# Patient Record
Sex: FEMALE | Race: WHITE | NOT HISPANIC OR LATINO | URBAN - METROPOLITAN AREA
[De-identification: names, ages, dates, MRNs, and addresses within clinical notes are randomized per-mention and may not be internally consistent; named-entity substitution may affect disease eponyms.]

---

## 2018-06-26 ENCOUNTER — APPOINTMENT (OUTPATIENT)
Age: 38
Setting detail: DERMATOLOGY
End: 2018-06-29

## 2018-06-26 DIAGNOSIS — D22 MELANOCYTIC NEVI: ICD-10-CM

## 2018-06-26 DIAGNOSIS — L57.8 OTHER SKIN CHANGES DUE TO CHRONIC EXPOSURE TO NONIONIZING RADIATION: ICD-10-CM

## 2018-06-26 DIAGNOSIS — D18.0 HEMANGIOMA: ICD-10-CM

## 2018-06-26 DIAGNOSIS — D485 NEOPLASM OF UNCERTAIN BEHAVIOR OF SKIN: ICD-10-CM

## 2018-06-26 PROBLEM — D22.5 MELANOCYTIC NEVI OF TRUNK: Status: ACTIVE | Noted: 2018-06-26

## 2018-06-26 PROBLEM — D18.01 HEMANGIOMA OF SKIN AND SUBCUTANEOUS TISSUE: Status: ACTIVE | Noted: 2018-06-26

## 2018-06-26 PROBLEM — D22.62 MELANOCYTIC NEVI OF LEFT UPPER LIMB, INCLUDING SHOULDER: Status: ACTIVE | Noted: 2018-06-26

## 2018-06-26 PROBLEM — D22.61 MELANOCYTIC NEVI OF RIGHT UPPER LIMB, INCLUDING SHOULDER: Status: ACTIVE | Noted: 2018-06-26

## 2018-06-26 PROBLEM — D48.5 NEOPLASM OF UNCERTAIN BEHAVIOR OF SKIN: Status: ACTIVE | Noted: 2018-06-26

## 2018-06-26 PROCEDURE — 99203 OFFICE O/P NEW LOW 30 MIN: CPT | Mod: 25

## 2018-06-26 PROCEDURE — 11101: CPT

## 2018-06-26 PROCEDURE — 11100: CPT

## 2018-06-26 PROCEDURE — OTHER MIPS QUALITY: OTHER

## 2018-06-26 PROCEDURE — OTHER BIOPSY BY SHAVE METHOD: OTHER

## 2018-06-26 PROCEDURE — OTHER DEFER: OTHER

## 2018-06-26 PROCEDURE — OTHER COUNSELING: OTHER

## 2018-06-26 ASSESSMENT — LOCATION DETAILED DESCRIPTION DERM
LOCATION DETAILED: RIGHT PROXIMAL DORSAL FOREARM
LOCATION DETAILED: LEFT LATERAL SUPERIOR TARSAL REGION
LOCATION DETAILED: LEFT PROXIMAL DORSAL FOREARM
LOCATION DETAILED: UPPER STERNUM
LOCATION DETAILED: RIGHT MEDIAL UPPER BACK
LOCATION DETAILED: SUBXIPHOID
LOCATION DETAILED: INFERIOR MID FOREHEAD
LOCATION DETAILED: RIGHT SUPERIOR MEDIAL UPPER BACK
LOCATION DETAILED: LEFT RIB CAGE
LOCATION DETAILED: LEFT PROXIMAL POSTERIOR UPPER ARM
LOCATION DETAILED: RIGHT INFERIOR LATERAL MIDBACK
LOCATION DETAILED: RIGHT PROXIMAL POSTERIOR UPPER ARM

## 2018-06-26 ASSESSMENT — LOCATION SIMPLE DESCRIPTION DERM
LOCATION SIMPLE: LEFT LATERAL SUPERIOR TARSAL REGION
LOCATION SIMPLE: ABDOMEN
LOCATION SIMPLE: INFERIOR FOREHEAD
LOCATION SIMPLE: LEFT POSTERIOR UPPER ARM
LOCATION SIMPLE: RIGHT FOREARM
LOCATION SIMPLE: LEFT FOREARM
LOCATION SIMPLE: RIGHT UPPER BACK
LOCATION SIMPLE: RIGHT POSTERIOR UPPER ARM
LOCATION SIMPLE: CHEST
LOCATION SIMPLE: RIGHT LOWER BACK

## 2018-06-26 ASSESSMENT — LOCATION ZONE DERM
LOCATION ZONE: TRUNK
LOCATION ZONE: FACE
LOCATION ZONE: ARM
LOCATION ZONE: EYELID

## 2018-06-26 NOTE — PROCEDURE: BIOPSY BY SHAVE METHOD
Destruction After The Procedure: No
Type Of Destruction Used: Curettage
Anesthesia Volume In Cc (Will Not Render If 0): 0.3
Silver Nitrate Text: The wound bed was treated with silver nitrate after the biopsy was performed.
Hemostasis: Drysol
Notification Instructions: Patient will be notified of biopsy results. However, patient instructed to call the office if not contacted within 2 weeks.
Biopsy Method: Personna blade
Additional Anesthesia Volume In Cc (Will Not Render If 0): 0
Wound Care: Vaseline
Was A Bandage Applied: Yes
Electrodesiccation And Curettage Text: The wound bed was treated with electrodesiccation and curettage after the biopsy was performed.
Electrodesiccation Text: The wound bed was treated with electrodesiccation after the biopsy was performed.
Depth Of Biopsy: dermis
Biopsy Type: H and E
Dressing: bandage
Cryotherapy Text: The wound bed was treated with cryotherapy after the biopsy was performed.
Anesthesia Type: 1% lidocaine with epinephrine and a 1:10 solution of 8.4% sodium bicarbonate
Detail Level: Detailed
Post-Care Instructions: I reviewed with the patient in detail post-care instructions. Patient is to keep the biopsy site dry overnight, and then apply bacitracin twice daily until healed. Patient may apply hydrogen peroxide soaks to remove any crusting.
Billing Type: Third-Party Bill
Consent: Written consent was obtained and risks were reviewed including but not limited to scarring, infection, bleeding, scabbing, incomplete removal, nerve damage and allergy to anesthesia.

## 2018-07-17 ENCOUNTER — APPOINTMENT (OUTPATIENT)
Age: 38
Setting detail: DERMATOLOGY
End: 2018-07-17

## 2018-07-17 ENCOUNTER — APPOINTMENT (OUTPATIENT)
Age: 38
Setting detail: DERMATOLOGY
End: 2018-08-28

## 2018-07-17 DIAGNOSIS — D18.01 HEMANGIOMA OF SKIN AND SUBCUTANEOUS TISSUE: ICD-10-CM

## 2018-07-17 DIAGNOSIS — D485 NEOPLASM OF UNCERTAIN BEHAVIOR OF SKIN: ICD-10-CM

## 2018-07-17 DIAGNOSIS — D18.0 HEMANGIOMA: ICD-10-CM

## 2018-07-17 DIAGNOSIS — D22 MELANOCYTIC NEVI: ICD-10-CM

## 2018-07-17 PROBLEM — D48.5 NEOPLASM OF UNCERTAIN BEHAVIOR OF SKIN: Status: ACTIVE | Noted: 2018-07-17

## 2018-07-17 PROCEDURE — OTHER OTHER: OTHER

## 2018-07-17 PROCEDURE — 99213 OFFICE O/P EST LOW 20 MIN: CPT

## 2018-07-17 PROCEDURE — OTHER BIOPSY BY PUNCH METHOD: OTHER

## 2018-07-17 PROCEDURE — OTHER COUNSELING: OTHER

## 2018-07-17 PROCEDURE — OTHER DEFER: OTHER

## 2018-07-17 PROCEDURE — OTHER COUNSELING - ANGIOMA: OTHER

## 2018-07-17 ASSESSMENT — LOCATION SIMPLE DESCRIPTION DERM
LOCATION SIMPLE: LEFT MEDIAL SUPERIOR TARSAL REGION
LOCATION SIMPLE: LEFT CHEEK
LOCATION SIMPLE: LEFT CHEEK
LOCATION SIMPLE: LEFT SUPERIOR EYELID
LOCATION SIMPLE: LEFT CHEEK

## 2018-07-17 ASSESSMENT — LOCATION DETAILED DESCRIPTION DERM
LOCATION DETAILED: LEFT MEDIAL SUPERIOR TARSAL REGION
LOCATION DETAILED: LEFT SUPERIOR MEDIAL MALAR CHEEK
LOCATION DETAILED: LEFT SUPERIOR CENTRAL MALAR CHEEK
LOCATION DETAILED: LEFT LATERAL SUPERIOR EYELID
LOCATION DETAILED: LEFT SUPERIOR MEDIAL MALAR CHEEK

## 2018-07-17 ASSESSMENT — LOCATION ZONE DERM
LOCATION ZONE: EYELID
LOCATION ZONE: FACE
LOCATION ZONE: EYELID
LOCATION ZONE: FACE
LOCATION ZONE: FACE

## 2018-07-17 NOTE — PROCEDURE: BIOPSY BY PUNCH METHOD
Epidermal Sutures: none, closed by secondary intention
Surgeon: XANDER Villatoro MD
Estimated Blood Loss (Cc): scant
Detail Level: Detailed
Size Of Lesion In Cm (Optional): 0.2
Punch Size In Mm: 2
Bill For Surgical Tray: no
X Depth Of Punch In Cm (Optional): 0
Consent: Written consent was obtained and risks, benefits, expectations and alternatives were reviewed including, but not limited to, scarring, infection, bleeding, scabbing, incomplete removal, nerve damage and allergy to anesthesia.
Notification Instructions: Patient will be notified of biopsy results. However, the patient was instructed to call the office if not contacted within 2 weeks.
Biopsy Type: H and E
Hemostasis: Drysol
Anesthesia Type: 1% lidocaine with epinephrine

## 2018-07-17 NOTE — PROCEDURE: OTHER
Detail Level: Detailed
Note Text (......Xxx Chief Complaint.): This diagnosis correlates with the
Other (Free Text): Advised patient that excision would require significant reconstruction due to location. As the lesion is benign appearing and not symptomatic, I have advised observation versus biopsy. Patient would like to continue with conservative observation.

## 2018-07-17 NOTE — PROCEDURE: OTHER
Detail Level: Detailed
Other (Free Text): .3 x .3 cm measures today \\nRecommended leaving lesion on eyelid alone
Note Text (......Xxx Chief Complaint.): This diagnosis correlates with the

## 2019-01-31 ENCOUNTER — APPOINTMENT (OUTPATIENT)
Age: 39
Setting detail: DERMATOLOGY
End: 2019-02-14

## 2019-01-31 DIAGNOSIS — L90.5 SCAR CONDITIONS AND FIBROSIS OF SKIN: ICD-10-CM

## 2019-01-31 PROCEDURE — OTHER COUNSELING: OTHER

## 2019-01-31 PROCEDURE — OTHER OTHER: OTHER

## 2019-01-31 ASSESSMENT — LOCATION SIMPLE DESCRIPTION DERM: LOCATION SIMPLE: LEFT CHEEK

## 2019-01-31 ASSESSMENT — LOCATION ZONE DERM: LOCATION ZONE: FACE

## 2019-01-31 ASSESSMENT — LOCATION DETAILED DESCRIPTION DERM: LOCATION DETAILED: LEFT MEDIAL MALAR CHEEK

## 2019-01-31 NOTE — PROCEDURE: OTHER
Note Text (......Xxx Chief Complaint.): This diagnosis correlates with the
Detail Level: Detailed
Other (Free Text): Patient with history of 2mm punch excision of left cheek lentigo July 2018. She has healed with an indented and slightly wider than 2mm scar. She is unhappy with the cosmetic appearance of the scar.\\n\\nI discussed scar revision with surgical excision of the indented circular scar with placement of everting sutures to help prevent future indentation. I have drawn placement of incision and resultant scar length. \\n\\nOffered to perform revision at no cost. Patient will consider options and call back to schedule.

## 2019-02-07 NOTE — PROCEDURE: DEFER
Detail Level: Detailed
Instructions (Optional): Cosmetic quote given.
Procedure To Be Performed At Next Visit: -- Select One
Color consistent with ethnicity/race, warm, dry intact, resilient.

## 2024-10-30 ENCOUNTER — APPOINTMENT (RX ONLY)
Dept: URBAN - METROPOLITAN AREA CLINIC 166 | Facility: CLINIC | Age: 44
Setting detail: DERMATOLOGY
End: 2024-10-30

## 2024-10-30 DIAGNOSIS — D22 MELANOCYTIC NEVI: ICD-10-CM

## 2024-10-30 DIAGNOSIS — L57.0 ACTINIC KERATOSIS: ICD-10-CM

## 2024-10-30 DIAGNOSIS — D18.0 HEMANGIOMA: ICD-10-CM

## 2024-10-30 DIAGNOSIS — L82.1 OTHER SEBORRHEIC KERATOSIS: ICD-10-CM

## 2024-10-30 DIAGNOSIS — Z71.89 OTHER SPECIFIED COUNSELING: ICD-10-CM

## 2024-10-30 DIAGNOSIS — L81.4 OTHER MELANIN HYPERPIGMENTATION: ICD-10-CM

## 2024-10-30 PROBLEM — D18.01 HEMANGIOMA OF SKIN AND SUBCUTANEOUS TISSUE: Status: ACTIVE | Noted: 2024-10-30

## 2024-10-30 PROBLEM — D22.5 MELANOCYTIC NEVI OF TRUNK: Status: ACTIVE | Noted: 2024-10-30

## 2024-10-30 PROCEDURE — ? ADDITIONAL NOTES

## 2024-10-30 PROCEDURE — 99203 OFFICE O/P NEW LOW 30 MIN: CPT | Mod: 25

## 2024-10-30 PROCEDURE — ? COUNSELING

## 2024-10-30 PROCEDURE — ? LIQUID NITROGEN

## 2024-10-30 PROCEDURE — 17000 DESTRUCT PREMALG LESION: CPT

## 2024-10-30 PROCEDURE — ? ELECTRODESICCATION (COSMETIC)

## 2024-10-30 ASSESSMENT — LOCATION SIMPLE DESCRIPTION DERM
LOCATION SIMPLE: RIGHT SHOULDER
LOCATION SIMPLE: RIGHT FOREHEAD
LOCATION SIMPLE: LEFT LOWER BACK
LOCATION SIMPLE: LEFT MEDIAL SUPERIOR TARSAL REGION
LOCATION SIMPLE: RIGHT UPPER BACK
LOCATION SIMPLE: CHEST

## 2024-10-30 ASSESSMENT — LOCATION DETAILED DESCRIPTION DERM
LOCATION DETAILED: LEFT MEDIAL SUPERIOR TARSAL REGION
LOCATION DETAILED: RIGHT SUPERIOR UPPER BACK
LOCATION DETAILED: RIGHT SUPERIOR FOREHEAD
LOCATION DETAILED: LEFT SUPERIOR LATERAL LOWER BACK
LOCATION DETAILED: RIGHT ANTERIOR SHOULDER
LOCATION DETAILED: RIGHT LATERAL SUPERIOR CHEST

## 2024-10-30 ASSESSMENT — LOCATION ZONE DERM
LOCATION ZONE: TRUNK
LOCATION ZONE: ARM
LOCATION ZONE: EYELID
LOCATION ZONE: FACE

## 2024-10-30 NOTE — PROCEDURE: ELECTRODESICCATION (COSMETIC)
Driver: 3.4
Detail Level: Zone
Post-Care Instructions: I reviewed with the patient in detail post-care instructions. Patient is to wear sunprotection, and avoid picking at any of the treated lesions. Pt may apply Vaseline to crusted or scabbing areas
Price (Use Numbers Only, No Special Characters Or $): 150
Consent: The patient's consent was obtained including but not limited to risks of crusting, scabbing, blistering, scarring, darker or lighter pigmentary change, recurrence, incomplete removal and infection.

## 2024-10-30 NOTE — PROCEDURE: ADDITIONAL NOTES
Additional Notes: Discussed multiple treatments are likely needed to resolve spot on eyelid, discussed this is cosmetic in nature. $150/treatment.
Render Risk Assessment In Note?: no
Detail Level: Zone

## 2024-10-30 NOTE — PROCEDURE: LIQUID NITROGEN
Number Of Freeze-Thaw Cycles: 2 freeze-thaw cycles
Consent: The patient's consent was obtained and potential side effects were briefly reviewed. Patient aware lesions may require more than one treatment.
Show Aperture Variable?: Yes
Detail Level: Detailed
Duration Of Freeze Thaw-Cycle (Seconds): 1
Application Tool (Optional): Liquid Nitrogen Sprayer
Post-Care Instructions: Post-care reviewed including sun protection and Vaseline as needed to crusted or scabbed areas.
Render Note In Bullet Format When Appropriate: No

## 2024-12-30 ENCOUNTER — APPOINTMENT (OUTPATIENT)
Dept: URBAN - METROPOLITAN AREA CLINIC 173 | Facility: CLINIC | Age: 44
Setting detail: DERMATOLOGY
End: 2024-12-30

## 2024-12-30 DIAGNOSIS — L57.8 OTHER SKIN CHANGES DUE TO CHRONIC EXPOSURE TO NONIONIZING RADIATION: ICD-10-CM

## 2024-12-30 DIAGNOSIS — Z41.9 ENCOUNTER FOR PROCEDURE FOR PURPOSES OTHER THAN REMEDYING HEALTH STATE, UNSPECIFIED: ICD-10-CM

## 2024-12-30 PROCEDURE — ? PRESCRIPTION

## 2024-12-30 PROCEDURE — ? FILLERS

## 2024-12-30 PROCEDURE — ? OTHER (COSMETIC)

## 2024-12-30 PROCEDURE — ? DYSPORT

## 2024-12-30 PROCEDURE — ? COSMETIC CONSULTATION: BOTOX

## 2024-12-30 PROCEDURE — ? COSMETIC CONSULTATION: FILLERS

## 2024-12-30 RX ORDER — PHARMACY COMPOUNDING ACCESSORY
EACH MISCELLANEOUS
Qty: 30 | Refills: 3 | Status: ERX | COMMUNITY
Start: 2024-12-30

## 2024-12-30 RX ADMIN — Medication: at 00:00

## 2024-12-30 NOTE — PROCEDURE: FILLERS
[place] Filler Volume In Cc: 0
Include Cannula Information In Note?: No
Lot #: [id number]
Expiration Date (Month Year): 1/31/27
Anesthesia Volume In Cc: 0.5
Lot #: [id number]
Additional Anesthesia Volume In Cc: 6
Detail Level: Detailed
Topical Anesthesia?: 30% lidocaine
Additional Area 1 Location: face
Expiration Date (Month Year): 6/25/25
Consent: Risks reviewed including but not limited to bruising, irregular texture, incomplete augmentation and procedural pain. Written consent obtained.
Price (Use Numbers Only, No Special Characters Or $): 878
Include Cannula Length?: 1.5 inch
Filler: Zan Gray
Map Statment: See Attach Map for Complete Details
Cheeks Filler Volume In Cc: 1

## 2024-12-30 NOTE — PROCEDURE: DYSPORT
Topical Anesthesia?: 30% lidocaine
Show Masseter Units: Yes
Periorbital Skin Units: 0
Lot #: [id number]
Show Lcl Units: No
Dilution (U/0.1 Cc): 10
Expiration Date (Month Year): 6/30/26
Price (Use Numbers Only, No Special Characters Or $): 310
Additional Area 1 Location: face
Consent: Written consent obtained. Risks include but not limited to lid/brow ptosis, bruising, swelling, diplopia, temporary effect, incomplete chemical denervation.
Additional Area 1 Units: 62
Post-Care Instructions: Patient instructed to not lie down for 4 hours and limit physical activity for 24 hours.
Detail Level: Detailed

## 2024-12-30 NOTE — PROCEDURE: OTHER (COSMETIC)
Detail Level: Zone
Other (Free Text): Lesion on left upper eyelid after 1 treatment of electrodesiccation is gone. Patient advised to re-evaluate in a few months.
Other (Free Text): Discussed  left cheek more than right cheek x1 syringe , fine lines and lips x.5 syringe.